# Patient Record
Sex: FEMALE | Race: WHITE | Employment: OTHER | URBAN - METROPOLITAN AREA
[De-identification: names, ages, dates, MRNs, and addresses within clinical notes are randomized per-mention and may not be internally consistent; named-entity substitution may affect disease eponyms.]

---

## 2017-08-17 ENCOUNTER — HOSPITAL ENCOUNTER (OUTPATIENT)
Dept: MAMMOGRAPHY | Age: 62
Discharge: HOME | End: 2017-08-17
Attending: OBSTETRICS & GYNECOLOGY
Payer: COMMERCIAL

## 2017-08-17 DIAGNOSIS — Z12.31 ENCOUNTER FOR SCREENING MAMMOGRAM FOR BREAST CANCER: ICD-10-CM

## 2017-08-17 PROCEDURE — 77067 SCR MAMMO BI INCL CAD: CPT

## 2018-09-27 ENCOUNTER — HOSPITAL ENCOUNTER (OUTPATIENT)
Dept: MAMMOGRAPHY | Age: 63
Discharge: HOME | End: 2018-09-27
Attending: OBSTETRICS & GYNECOLOGY
Payer: MEDICARE

## 2018-09-27 DIAGNOSIS — Z12.31 VISIT FOR SCREENING MAMMOGRAM: ICD-10-CM

## 2018-09-27 PROCEDURE — 77063 BREAST TOMOSYNTHESIS BI: CPT

## 2019-09-28 ENCOUNTER — HOSPITAL ENCOUNTER (OUTPATIENT)
Dept: MAMMOGRAPHY | Age: 64
Discharge: HOME | End: 2019-09-28
Attending: OBSTETRICS & GYNECOLOGY
Payer: MEDICARE

## 2019-09-28 DIAGNOSIS — Z12.39 SCREENING FOR BREAST CANCER: ICD-10-CM

## 2019-09-28 PROCEDURE — 77063 BREAST TOMOSYNTHESIS BI: CPT

## 2019-10-28 PROBLEM — N39.3 STRESS INCONTINENCE: Status: ACTIVE | Noted: 2019-10-28

## 2020-10-02 ENCOUNTER — HOSPITAL ENCOUNTER (OUTPATIENT)
Dept: MAMMOGRAPHY | Age: 65
Discharge: HOME | End: 2020-10-02
Attending: OBSTETRICS & GYNECOLOGY
Payer: MEDICARE

## 2020-10-02 DIAGNOSIS — R29.890 LOSS OF HEIGHT: ICD-10-CM

## 2020-10-02 DIAGNOSIS — Z12.39 SCREENING FOR BREAST CANCER: ICD-10-CM

## 2020-10-02 PROCEDURE — 77080 DXA BONE DENSITY AXIAL: CPT

## 2020-10-02 PROCEDURE — 77063 BREAST TOMOSYNTHESIS BI: CPT

## 2020-10-23 NOTE — PROGRESS NOTES
Harley Contreras your bone density looked pretty good.  I recommend vitamin D 5000 units and calcium 1200 mg daily

## 2021-10-23 ENCOUNTER — HOSPITAL ENCOUNTER (OUTPATIENT)
Dept: MAMMOGRAPHY | Age: 66
Discharge: HOME | End: 2021-10-23
Attending: OBSTETRICS & GYNECOLOGY
Payer: MEDICARE

## 2021-10-23 DIAGNOSIS — Z12.31 ENCOUNTER FOR SCREENING MAMMOGRAM FOR BREAST CANCER: ICD-10-CM

## 2021-10-23 PROCEDURE — 77063 BREAST TOMOSYNTHESIS BI: CPT

## 2022-03-19 PROBLEM — N39.3 STRESS INCONTINENCE: Status: ACTIVE | Noted: 2019-10-28

## 2022-07-01 RX ORDER — OMEPRAZOLE 40 MG/1
CAPSULE, DELAYED RELEASE ORAL
Qty: 90 CAPSULE | Refills: 1 | Status: SHIPPED | OUTPATIENT
Start: 2022-07-01

## 2022-10-03 ENCOUNTER — TELEPHONE (OUTPATIENT)
Dept: OBGYN CLINIC | Age: 67
End: 2022-10-03

## 2022-10-07 ENCOUNTER — OFFICE VISIT (OUTPATIENT)
Dept: OBGYN CLINIC | Age: 67
End: 2022-10-07
Payer: MEDICARE

## 2022-10-07 VITALS
BODY MASS INDEX: 30.13 KG/M2 | HEIGHT: 61 IN | SYSTOLIC BLOOD PRESSURE: 128 MMHG | DIASTOLIC BLOOD PRESSURE: 80 MMHG | WEIGHT: 159.6 LBS

## 2022-10-07 DIAGNOSIS — Z79.890 HORMONE REPLACEMENT THERAPY, POSTMENOPAUSAL: ICD-10-CM

## 2022-10-07 DIAGNOSIS — Z12.31 ENCOUNTER FOR SCREENING MAMMOGRAM FOR MALIGNANT NEOPLASM OF BREAST: ICD-10-CM

## 2022-10-07 DIAGNOSIS — N89.9 NODULE OF VAGINA: ICD-10-CM

## 2022-10-07 DIAGNOSIS — N95.1 SYMPTOMATIC MENOPAUSAL OR FEMALE CLIMACTERIC STATES: ICD-10-CM

## 2022-10-07 DIAGNOSIS — K92.1 HEMATOCHEZIA: ICD-10-CM

## 2022-10-07 DIAGNOSIS — Z12.72 VAGINAL PAP SMEAR: ICD-10-CM

## 2022-10-07 DIAGNOSIS — Z12.4 SCREENING FOR CERVICAL CANCER: Primary | ICD-10-CM

## 2022-10-07 PROCEDURE — G0101 CA SCREEN;PELVIC/BREAST EXAM: HCPCS | Performed by: OBSTETRICS & GYNECOLOGY

## 2022-10-07 PROCEDURE — G8399 PT W/DXA RESULTS DOCUMENT: HCPCS | Performed by: OBSTETRICS & GYNECOLOGY

## 2022-10-07 PROCEDURE — G8427 DOCREV CUR MEDS BY ELIG CLIN: HCPCS | Performed by: OBSTETRICS & GYNECOLOGY

## 2022-10-07 PROCEDURE — G8484 FLU IMMUNIZE NO ADMIN: HCPCS | Performed by: OBSTETRICS & GYNECOLOGY

## 2022-10-07 PROCEDURE — G8417 CALC BMI ABV UP PARAM F/U: HCPCS | Performed by: OBSTETRICS & GYNECOLOGY

## 2022-10-07 PROCEDURE — 3017F COLORECTAL CA SCREEN DOC REV: CPT | Performed by: OBSTETRICS & GYNECOLOGY

## 2022-10-07 PROCEDURE — 1036F TOBACCO NON-USER: CPT | Performed by: OBSTETRICS & GYNECOLOGY

## 2022-10-07 PROCEDURE — 1090F PRES/ABSN URINE INCON ASSESS: CPT | Performed by: OBSTETRICS & GYNECOLOGY

## 2022-10-07 PROCEDURE — 99214 OFFICE O/P EST MOD 30 MIN: CPT | Performed by: OBSTETRICS & GYNECOLOGY

## 2022-10-07 RX ORDER — PSEUDOEPHEDRINE HCL 30 MG
200 TABLET ORAL
COMMUNITY
Start: 2020-10-30

## 2022-10-07 RX ORDER — PROGESTERONE 100 MG/1
100 CAPSULE ORAL DAILY
Qty: 90 CAPSULE | Refills: 3 | Status: SHIPPED | OUTPATIENT
Start: 2022-10-07

## 2022-10-07 RX ORDER — ESTRADIOL 2 MG/1
2 TABLET ORAL DAILY
Qty: 90 TABLET | Refills: 3 | Status: SHIPPED | OUTPATIENT
Start: 2022-10-07

## 2022-10-07 NOTE — PROGRESS NOTES
Established pt of Dr. Cassy Hernandez   69 y.o.  1955  [id number]    Today:  10/7/2022     Reports:  G0, Lost weight drinking \"David\" flavored water. Her urine turned a \"reddish yellow\" over the past couple days. Stopped drinking \"David\", drinking plain water now, believes her urine is clearing. Denies:  dysuria, hematuria. A few months ago she felt faint, had acute onset of nausea and bloody diarrhea. Has a h/o hemorrhoids that bleed more when she increases her working out. BC:   post menopausal status.     Past Medical History:   Diagnosis Date    Hypercholesterolemia     Hypothyroid     Menopause        Past Surgical History:   Procedure Laterality Date    BREAST BIOPSY Left     benign    COLONOSCOPY  ;     due     KNEE ARTHROSCOPY Right 10/30/15    PRE-MALIGNANT / BENIGN SKIN LESION EXCISION         Family History   Problem Relation Age of Onset    Colon Cancer Neg Hx     Diabetes Mother     Hypertension Mother     Hypertension Father     Breast Cancer Neg Hx        OB History    Para Term  AB Living   0 0 0 0 0 0   SAB IAB Ectopic Molar Multiple Live Births   0 0 0 0 0 0       Social History     Socioeconomic History    Marital status:      Spouse name: None    Number of children: None    Years of education: None    Highest education level: None   Tobacco Use    Smoking status: Never    Smokeless tobacco: Never   Substance and Sexual Activity    Alcohol use: Yes    Drug use: No   Social History Narrative    Never had abnormal pap  No STDs     Jossie Ya          Current Outpatient Medications   Medication Sig    Probiotic Product (PROBIOTIC-10 PO) Take by mouth    Pediatric Multivitamins-Fl (MULTIVITAMINS/FL PO) TAKE ONE CAP/TAB BY MOUTH EVERY DAY    vitamin D (CHOLECALCIFEROL) 25 MCG (1000 UT) TABS tablet 25 mcg    docusate (COLACE, DULCOLAX) 100 MG CAPS 200 mg    omeprazole (PRILOSEC) 40 MG delayed release capsule TAKE 1 CAPSULE BY MOUTH EVERY DAY    Calcium Carb-Cholecalciferol 600-400 MG-UNIT CAPS Take by mouth    estradiol (ESTRACE) 2 MG tablet Take 2 mg by mouth daily    hydrocortisone 2.5 % cream Place rectally 4 times daily    levothyroxine (SYNTHROID) 75 MCG tablet TAKE 1 TABLET DAILY BEFORE BREAKFAST    progesterone (PROMETRIUM) 100 MG CAPS capsule TAKE 1 CAPSULE BY MOUTH EVERY DAY AT NIGHT    rosuvastatin (CRESTOR) 10 MG tablet Take 10 mg by mouth    Timolol (TIMOPTIC) 0.5 % SOLN ophthalmic solution Apply 1 drop to eye daily     No current facility-administered medications for this visit. Review of Systems    Updated from patient's \"Review of Systems\" form that patient completed. Physical Exam:   /80   Ht 5' 1\" (1.549 m)   Wt 159 lb 9.6 oz (72.4 kg)   BMI 30.16 kg/m² , No LMP recorded. Patient is postmenopausal.    Patient is a 69 y.o. female who appears pleasant, in no apparent distress, her given age, well developed, well nourished and with good attention to hygiene and body habitus. Oriented to person, place and time. Mood and affect normal and appropriate to situation. Head is normocephalic, atraumatic, without any gross head or neck masses. Neck: Neck exam reveals no abnormalities. Thyroid ~ 3-4 cm/symmetric, no abnormalities. Lymph nodes:   Neck lymph nodes are normal.   No supraclavicular lymphadenopathy noted. No axillary lymphadenopathy noted. No groin lymphadenopathy noted. Respiratory: Assessment of respiratory effort reveals even and non labored respirations. Lungs CTA. Cardiovascular: Heart auscultation reveals no murmurs, gallop, rubs or clicks. Skin: No skin rash, abnormal appearing nevi, subcutaneous nodules, lesions or ulcers observed. Abdomen: Abdomen soft, non tender without palpable masses. Palpation of liver reveals no abnormalities with respect to size, tenderness or masses. Palpation of spleen reveals no abnormalities with respect to size, tenderness or masses. Breast: Symmetrical, no: dimpling, retractions, adenopathy, dominant masses or nipple discharge elicited. Breasts show no palpable masses or tenderness. Bilateral Fibrocystic change is:  mild   No changes suspicious for malignancy      Genitourinary:  External genitalia are anatomically normal in appearance. Diffuse atrophic changes consistent with age and hormonal status noted throughout her SSE. Examination of urethral meatus reveals location normal and size normal.   Examination of urethra shows no abnormalities. Vagina:  a small nodule very high in the vagina adjacent to the cervix on the left vaginal sidewall ~ 3:00, ~ 5 mm max width, left vaginal sidewall pale with decreased rugations. Cervix: shows no pathology. Uterus:  Normal size, shape and contour. Suboptimal bimanual exam secondary to BMI   Adnexa and parametria show no masses, tenderness, organomegaly or nodularity. Examination of anus and perineum shows no abnormalities. Rectal exam reveals normal sphincter tone without presence of hemorrhoids or masses, stool heme negative     ASSESSMENT and PLAN    1. Importance of self breast exam reviewed, pt educated how to perform SBE.      2.  Her PCP, Dr. Demi Ramirez) follows her labs & manages her vaccines. 3.  Per pt:  covid vacc w/ boosters current. Flu vacc current 9/20/2022  Unsure when her last tdap vacc was, enouraged to update if she thinks it has been > 10 y  Needs to clarify w/ her PCP her pneumococcus status    Immunization History   Administered Date(s) Administered    Zoster Live (Zostavax) 05/24/2014     4. MMG is:  due after 10/25/2022, ordered    5. No h/o abnormal paps. Patient requests pap today. Dr. Everett has been doing her pap every other year. Left vaginal sidewall nodule adjacent to the cervix, cervical pap and left vaginal sidewall pap collected and sent separately    6.   Dermatologist, sees Dr. Paula Radford @ [facility]    7.  10/2020 Dexa:    IMPRESSION:     Using the World Health Organization criteria, the bone mineral density is   normal.       10 year probability of major osteoporotic fracture:  8.1%   10 year probability of hip fracture:  0.6%      Dr. Randall Reynoso advised:  vitamin D 5000 units and calcium 1200 mg daily    8. Colonoscopy current, pt can't recall who did her colonosocpy, thinks repeat is due ~ 2026. States Dr. Randall Reynoso referred her, unable to find a referral or any documentation of a colonosocpy in her current EMR, \"[facility] EMR or Care Everywhere\". Advised pt to let her PCP know if she experiences another episode of hematochezia. 9.  HRT  Estradiol 2 mg po/d and Prometrium 100 qhs. Suggest she decrease Estradiol to 1 mg/d, if hot flashes/night sweats worsen she can resume 2 mg/d. Message to Lance Milder: Please call patient, suggest she take half her estradiol tablet daily (take 1 mg instead of 2 mg). Ideally it is better to take the lowest effective dose of hormones. If she becomes symptomatic on the 1 mg dose (i.e. hot flashes/night sweats) then she can resume the 2 mg dose. 10.  Per Dr. Williams Chacon 10/2021 referral to ENT to eval chronic dry cough. 12/9/2021-->Laryngoscopy with Tariq Goodwin PA-C, dxd mild/moderate extraesophageal reflux    11. Per pt walks 4-5 miles 6 days/wk    Brought in her lab work from Dr. Hairston Ohm:   2/2022: Normal: CBC, CMP TSH (0.93) & free T4 (1.7). Slightly elevated: Total cholesterol 219 & . Triglycerides, HDL and VLDL are all normal.  HDL is 80     8/4/2022: Normal: CMP (except BUN/creatinine slightly low), CBC (except MCH slightly low), lipids all WNL, HDL 88  TSH 0.352 (slightly low, per PCP no change at this time, watching), free T4 1.7  Vitamin D 57    Re ROS--> non gynecologic concerns referred back to her PCP or appropriate specialist.       Wt Readings from Last 3 Encounters:   Wt Readings from Last 3 Encounters:   10/07/22 159 lb 9.6

## 2022-10-11 ENCOUNTER — TELEPHONE (OUTPATIENT)
Dept: OBGYN CLINIC | Age: 67
End: 2022-10-11

## 2022-10-11 NOTE — TELEPHONE ENCOUNTER
Called pt to clarify to take half of the estradiol tablet 1mg starting off and if her hot flashes and night sweat continues she can resume taking the 2mg dose.  Pt agreed and said that she will take the 1mg starting off.-Lidya DUMONT

## 2022-11-05 ENCOUNTER — HOSPITAL ENCOUNTER (OUTPATIENT)
Dept: MAMMOGRAPHY | Age: 67
Discharge: HOME | End: 2022-11-08
Payer: MEDICARE

## 2022-11-05 DIAGNOSIS — Z12.31 ENCOUNTER FOR SCREENING MAMMOGRAM FOR MALIGNANT NEOPLASM OF BREAST: ICD-10-CM

## 2022-11-05 PROCEDURE — 77067 SCR MAMMO BI INCL CAD: CPT

## 2022-11-06 DIAGNOSIS — N95.1 SYMPTOMATIC MENOPAUSAL OR FEMALE CLIMACTERIC STATES: ICD-10-CM

## 2022-11-07 RX ORDER — PROGESTERONE 100 MG/1
CAPSULE ORAL
Qty: 90 CAPSULE | Refills: 3 | OUTPATIENT
Start: 2022-11-07

## 2022-11-07 NOTE — RESULT ENCOUNTER NOTE
Notify pt cervical  pap nl, hpv neg, and negative cytology of vaginal sidewall lesion. Will reassess at f/u anticipate this is all normal/no concern. thanks.

## 2023-01-24 ENCOUNTER — OFFICE VISIT (OUTPATIENT)
Dept: OBGYN CLINIC | Age: 68
End: 2023-01-24
Payer: MEDICARE

## 2023-01-24 VITALS
BODY MASS INDEX: 30.4 KG/M2 | DIASTOLIC BLOOD PRESSURE: 74 MMHG | HEIGHT: 61 IN | SYSTOLIC BLOOD PRESSURE: 132 MMHG | WEIGHT: 161 LBS

## 2023-01-24 DIAGNOSIS — N89.8 VAGINAL DISCHARGE: Primary | ICD-10-CM

## 2023-01-24 DIAGNOSIS — Z79.890 CURRENT LONG-TERM USE OF POSTMENOPAUSAL HORMONE REPLACEMENT THERAPY: ICD-10-CM

## 2023-01-24 DIAGNOSIS — N89.8 VAGINAL ITCHING: ICD-10-CM

## 2023-01-24 PROCEDURE — G8399 PT W/DXA RESULTS DOCUMENT: HCPCS | Performed by: OBSTETRICS & GYNECOLOGY

## 2023-01-24 PROCEDURE — 1123F ACP DISCUSS/DSCN MKR DOCD: CPT | Performed by: OBSTETRICS & GYNECOLOGY

## 2023-01-24 PROCEDURE — G8417 CALC BMI ABV UP PARAM F/U: HCPCS | Performed by: OBSTETRICS & GYNECOLOGY

## 2023-01-24 PROCEDURE — 3017F COLORECTAL CA SCREEN DOC REV: CPT | Performed by: OBSTETRICS & GYNECOLOGY

## 2023-01-24 PROCEDURE — 99214 OFFICE O/P EST MOD 30 MIN: CPT | Performed by: OBSTETRICS & GYNECOLOGY

## 2023-01-24 PROCEDURE — 1036F TOBACCO NON-USER: CPT | Performed by: OBSTETRICS & GYNECOLOGY

## 2023-01-24 PROCEDURE — G8484 FLU IMMUNIZE NO ADMIN: HCPCS | Performed by: OBSTETRICS & GYNECOLOGY

## 2023-01-24 PROCEDURE — 1090F PRES/ABSN URINE INCON ASSESS: CPT | Performed by: OBSTETRICS & GYNECOLOGY

## 2023-01-24 PROCEDURE — G8427 DOCREV CUR MEDS BY ELIG CLIN: HCPCS | Performed by: OBSTETRICS & GYNECOLOGY

## 2023-01-24 RX ORDER — ESTRADIOL 0.5 MG/1
0.5 TABLET ORAL DAILY
Qty: 30 TABLET | Refills: 11 | Status: SHIPPED | OUTPATIENT
Start: 2023-01-24

## 2023-01-24 RX ORDER — LATANOPROST 50 UG/ML
SOLUTION/ DROPS OPHTHALMIC
COMMUNITY
Start: 2022-11-25

## 2023-01-24 NOTE — PROGRESS NOTES
Tommy Heads   69 y.o.  1955  [id number]    Today:  2023    F/u ov today to recheck vaginal nodule and discuss weaning off HRT. Is not sexually active, she and her  triny ok w abstinence     10/7/2022 ov:   Reports:  G0, Lost weight drinking \"[place]\" flavored water. Her urine turned a \"reddish yellow\" over the past couple days. Stopped drinking \"[place]\", drinking plain water now, believes her urine is clearing. Denies:  dysuria, hematuria. A few months ago she felt faint, had acute onset of nausea and bloody diarrhea. Has a h/o hemorrhoids that bleed more when she increases her working out. BC:   post menopausal status.     Past Medical History:   Diagnosis Date    Hypercholesterolemia     Hypothyroid     Menopause        Past Surgical History:   Procedure Laterality Date    BREAST BIOPSY Left     benign    COLONOSCOPY  ;     due     KNEE ARTHROSCOPY Right 10/30/15    PRE-MALIGNANT / BENIGN SKIN LESION EXCISION         Family History   Problem Relation Age of Onset    Diabetes Mother     Hypertension Mother     Hypertension Father     Colon Cancer Neg Hx     Breast Cancer Neg Hx     Ovarian Cancer Neg Hx     Prostate Cancer Neg Hx     Deep Vein Thrombosis Neg Hx     Pulmonary Embolism Neg Hx        OB History    Para Term  AB Living   0 0 0 0 0 0   SAB IAB Ectopic Molar Multiple Live Births   0 0 0 0 0 0   Obstetric Comments   G0       Social History     Socioeconomic History    Marital status:      Spouse name: None    Number of children: None    Years of education: None    Highest education level: None   Tobacco Use    Smoking status: Never    Smokeless tobacco: Never   Substance and Sexual Activity    Alcohol use: Yes    Drug use: No   Social History Narrative    Never had abnormal pap  No STDs     [address] [place] [address]        ENT:  Parish Phillips PA-C         Current Outpatient Medications   Medication Sig    latanoprost (XALATAN) 0.005 % ophthalmic solution INSTILL 1 DROP INTO BOTH EYES AT BEDTIME    estradiol (ESTRACE) 0.5 MG tablet Take 1 tablet by mouth daily Ok to discontinue estrogen and progesterone after 2-4 weeks of Estradiol 0.5 mg if you are tolerating the lower dose, if you are not having bad hot flashes or night sweats. terconazole (TERAZOL 7) 0.4 % vaginal cream 1 applicator intravaginal nightly x 7 nights    Probiotic Product (PROBIOTIC-10 PO) Take by mouth    Pediatric Multivitamins-Fl (MULTIVITAMINS/FL PO) TAKE ONE CAP/TAB BY MOUTH EVERY DAY    vitamin D (CHOLECALCIFEROL) 25 MCG (1000 UT) TABS tablet 25 mcg    docusate (COLACE, DULCOLAX) 100 MG CAPS 200 mg    progesterone (PROMETRIUM) 100 MG CAPS capsule Take 1 capsule by mouth daily TAKE 1 CAPSULE BY MOUTH DAILY    omeprazole (PRILOSEC) 40 MG delayed release capsule TAKE 1 CAPSULE BY MOUTH EVERY DAY    Calcium Carb-Cholecalciferol 600-400 MG-UNIT CAPS Take by mouth    hydrocortisone 2.5 % cream Place rectally 4 times daily    levothyroxine (SYNTHROID) 75 MCG tablet TAKE 1 TABLET DAILY BEFORE BREAKFAST    rosuvastatin (CRESTOR) 10 MG tablet Take 10 mg by mouth    Timolol (TIMOPTIC) 0.5 % SOLN ophthalmic solution Apply 1 drop to eye daily     No current facility-administered medications for this visit. Review of Systems    Updated from patient's \"Review of Systems\" form that patient completed. Physical Exam:   /74   Ht 5' 1\" (1.549 m)   Wt 161 lb (73 kg)   BMI 30.42 kg/m² , No LMP recorded. Patient is postmenopausal.    Patient is a 69 y.o. female who appears pleasant, in no apparent distress, her given age, well developed, well nourished and with good attention to hygiene and body habitus. Oriented to person, place and time. Mood and affect normal and appropriate to situation. Head is normocephalic, atraumatic, without any gross head or neck masses. Lymph nodes:    No groin lymphadenopathy noted.      Skin: No skin rash, abnormal appearing nevi, subcutaneous nodules, lesions or ulcers observed. Abdomen: Abdomen soft, non tender without palpable masses. Genitourinary:  External genitalia are anatomically normal in appearance. Bilateral symmetric inflammation. Diffuse atrophic changes consistent with age and hormonal status noted throughout her SSE. Examination of urethral meatus reveals location normal and size normal.   Examination of urethra shows no abnormalities. Vagina:  a small nodule very high in the vagina adjacent to the cervix on the left vaginal sidewall ~ 3:00, ~ 5 mm max width, left vaginal sidewall pale with decreased rugations. Appears pale/clear  Cervix: shows no pathology. ASSESSMENT and PLAN    1. F/u ~ 1 month w/ pelvic US  Left vaginal sidewall nodule adjacent to the cervix,     2. Vaginal itching, await NuSwab, ? HgbA1C -->Terazol cream (for empiric tx  when she is sx'tic w/ vaginal itching and clumpy white-pale yellow d/c)    3. Weaning off HRT:      10/7/2022 ov  7.  10/2020 Dexa:    IMPRESSION:     Using the World Health Organization criteria, the bone mineral density is   normal.       10 year probability of major osteoporotic fracture:  8.1%   10 year probability of hip fracture:  0.6%     8. Colonoscopy current, pt can't recall who did her colonoscopy, thinks repeat is due ~ 2026. States Dr. Socorro Ruiz referred her, unable to find a referral or any documentation of a colonoscopy in her current EMR, \"Connect [facility] EMR or Care Everywhere\". Advised pt to let her PCP know if she experiences another episode of hematochezia. 9.  HRT  Estradiol 2 mg po/d and Prometrium 100 qhs. Suggest she decrease Estradiol to 1 mg/d, if hot flashes/night sweats worsen she can resume 2 mg/d. Message to Julissa Almanzar: Please call patient, suggest she take half her estradiol tablet daily (take 1 mg instead of 2 mg). Ideally it is better to take the lowest effective dose of hormones.   If she becomes symptomatic on the 1 mg dose (i.e. hot flashes/night sweats) then she can resume the 2 mg dose. Re ROS--> non gynecologic concerns referred back to her PCP or appropriate specialist.       Wt Readings from Last 3 Encounters: Wt Readings from Last 3 Encounters:   01/24/23 161 lb (73 kg)   10/07/22 159 lb 9.6 oz (72.4 kg)   12/09/21 160 lb (72.6 kg)       BP Readings from Last 3 Encounters:  BP Readings from Last 3 Encounters:   01/24/23 132/74   10/07/22 128/80   09/28/21 (!) 140/78        Diagnosis Orders   1. Vaginal discharge  NuSwab Vaginitis Plus (VG+) with Canddia (Six Species)    NuSwab Vaginitis Plus (VG+) with Canddia (Six Species)    terconazole (TERAZOL 7) 0.4 % vaginal cream      2. Current long-term use of postmenopausal hormone replacement therapy  estradiol (ESTRACE) 0.5 MG tablet      3. Vaginal itching  terconazole (TERAZOL 7) 0.4 % vaginal cream        More than 50% of this 30-35+  minute visit was spent addressing the above patient concerns including:  assessment,  chart review and counseling the patient about the above concerns including evaluation plan, treatment strategies & documentation. Long conversation with patient, all her questions answered and addressed all her concerns. Dictated using voice recognition software.   Proofread but unrecognized errors may exist.    Signed by:  Gilman Essex, MD, [facility]

## 2023-01-28 LAB
A VAGINAE DNA VAG QL NAA+PROBE: NORMAL SCORE
BVAB2 DNA VAG QL NAA+PROBE: NORMAL SCORE
C ALBICANS DNA VAG QL NAA+PROBE: NEGATIVE
C GLABRATA DNA VAG QL NAA+PROBE: NEGATIVE
C TRACH RRNA SPEC QL NAA+PROBE: NEGATIVE
CANDIDA KRUSEI: NEGATIVE
CANDIDA LUSITANIAE, NAA, 180015: NEGATIVE
CANDIDA PARAPSILOSIS/TROPICALIS: NEGATIVE
MEGA1 DNA VAG QL NAA+PROBE: NORMAL SCORE
N GONORRHOEA RRNA SPEC QL NAA+PROBE: NEGATIVE
T VAGINALIS RRNA SPEC QL NAA+PROBE: NEGATIVE

## 2023-02-23 ENCOUNTER — PROCEDURE VISIT (OUTPATIENT)
Dept: OBGYN CLINIC | Age: 68
End: 2023-02-23
Payer: MEDICARE

## 2023-02-23 VITALS
DIASTOLIC BLOOD PRESSURE: 72 MMHG | HEIGHT: 61 IN | BODY MASS INDEX: 30.58 KG/M2 | WEIGHT: 162 LBS | SYSTOLIC BLOOD PRESSURE: 112 MMHG

## 2023-02-23 DIAGNOSIS — N89.9 NODULE OF VAGINA: Primary | ICD-10-CM

## 2023-02-23 PROCEDURE — 57100 BIOPSY VAGINAL MUCOSA SIMPLE: CPT | Performed by: OBSTETRICS & GYNECOLOGY

## 2023-02-23 SDOH — ECONOMIC STABILITY: FOOD INSECURITY: WITHIN THE PAST 12 MONTHS, YOU WORRIED THAT YOUR FOOD WOULD RUN OUT BEFORE YOU GOT MONEY TO BUY MORE.: NEVER TRUE

## 2023-02-23 SDOH — ECONOMIC STABILITY: FOOD INSECURITY: WITHIN THE PAST 12 MONTHS, THE FOOD YOU BOUGHT JUST DIDN'T LAST AND YOU DIDN'T HAVE MONEY TO GET MORE.: NEVER TRUE

## 2023-02-23 SDOH — ECONOMIC STABILITY: HOUSING INSECURITY
IN THE LAST 12 MONTHS, WAS THERE A TIME WHEN YOU DID NOT HAVE A STEADY PLACE TO SLEEP OR SLEPT IN A SHELTER (INCLUDING NOW)?: NO

## 2023-02-23 SDOH — ECONOMIC STABILITY: INCOME INSECURITY: HOW HARD IS IT FOR YOU TO PAY FOR THE VERY BASICS LIKE FOOD, HOUSING, MEDICAL CARE, AND HEATING?: NOT HARD AT ALL

## 2023-02-23 ASSESSMENT — PATIENT HEALTH QUESTIONNAIRE - PHQ9
SUM OF ALL RESPONSES TO PHQ9 QUESTIONS 1 & 2: 0
1. LITTLE INTEREST OR PLEASURE IN DOING THINGS: 0
SUM OF ALL RESPONSES TO PHQ QUESTIONS 1-9: 0
2. FEELING DOWN, DEPRESSED OR HOPELESS: 0
SUM OF ALL RESPONSES TO PHQ QUESTIONS 1-9: 0

## 2023-02-23 NOTE — PROGRESS NOTES
Pepe Kent  69 y.o.  1955  [id number]    Today:  2/23/23     OV today for vaginal sidewall bx. Done in colpo room to optimize visualization      Last pap:  10/2022 pap normal w/ neg HPV    No LMP recorded. Patient is postmenopausal.    BC:   post menopausal status. Allergies   Allergen Reactions    Bee Venom        Past Medical History:   Diagnosis Date    Hypercholesterolemia     Hypothyroid     Menopause        Past Surgical History:   Procedure Laterality Date    BREAST BIOPSY Left     benign    COLONOSCOPY  2006; 9/16    due 2026    KNEE ARTHROSCOPY Right 10/30/15    PRE-MALIGNANT / BENIGN SKIN LESION EXCISION         [unfilled]    Social History     Social History Narrative    Never had abnormal pap  No STDs     [address] [facility]        ENT:  Helena Leyden, PA-C       Family History   Problem Relation Age of Onset    Diabetes Mother     Hypertension Mother     Hypertension Father     Colon Cancer Neg Hx     Breast Cancer Neg Hx     Ovarian Cancer Neg Hx     Prostate Cancer Neg Hx     Deep Vein Thrombosis Neg Hx     Pulmonary Embolism Neg Hx        Physical Exam     /72   Ht 5' 1\" (1.549 m)   Wt 162 lb (73.5 kg)   BMI 30.61 kg/m²     Pepe Kent is a 69 y.o. female who appears pleasant, well developed, well nourished, with good attention to hygiene and body habitus. Oriented to person, place and time. Pepe Kent is in no apparent distress. Psych:  Mood and affect are normal and appropriate to the situation. Head is normocephalic, atraumatic, without any gross head or neck masses. Eyes: Sclera are clear. Conjunctiva and lids within normal limits. No icterus. Ears and Nose: no gross deformities to visual inspection, gross hearing intact    Skin:  No skin rash, subcutaneous nodules, lesions or ulcers observed.    External genitalia: normal anatomy  Diffuse atrophic changes consistent with age and hormonal status noted throughout her SSE   Vagina: pale w/ smooth sidewalls. Small nodule very high in the vagina, left vaginal sidewall ~3:00 relative to the cervix. Cervix: normal appearing, no lesions. Uterus: , normal size, shape and contour  Adnexa: bilaterally  no masses, non tender  Perineum and anus:  no abnormalities     Nodule excision from left vaginal sidewall: The left vaginal sidewall nodule was prepped w/ betadine then locally anesthetized with 1%  lidocaine. The nodule was removed sharply in it's entirety. Hemostasis was achieved using silver nitrate      A/P:  1.   left vaginal sidewall nodule , await biopsy results, will formulate treatment plan and follow up accordingly.     Dustin Ruth MD

## 2023-10-31 ENCOUNTER — TRANSCRIBE ORDERS (OUTPATIENT)
Dept: SCHEDULING | Age: 68
End: 2023-10-31

## 2023-10-31 DIAGNOSIS — Z12.31 SCREENING MAMMOGRAM FOR HIGH-RISK PATIENT: Primary | ICD-10-CM

## 2024-01-02 ENCOUNTER — OFFICE VISIT (OUTPATIENT)
Dept: OBGYN CLINIC | Age: 69
End: 2024-01-02
Payer: COMMERCIAL

## 2024-01-02 VITALS
SYSTOLIC BLOOD PRESSURE: 114 MMHG | DIASTOLIC BLOOD PRESSURE: 72 MMHG | HEIGHT: 61 IN | WEIGHT: 171.6 LBS | BODY MASS INDEX: 32.4 KG/M2

## 2024-01-02 DIAGNOSIS — Z12.31 ENCOUNTER FOR SCREENING MAMMOGRAM FOR MALIGNANT NEOPLASM OF BREAST: ICD-10-CM

## 2024-01-02 DIAGNOSIS — Z12.4 SCREENING FOR CERVICAL CANCER: ICD-10-CM

## 2024-01-02 DIAGNOSIS — Z11.51 SCREENING FOR HPV (HUMAN PAPILLOMAVIRUS): ICD-10-CM

## 2024-01-02 DIAGNOSIS — Z01.419 WELL WOMAN EXAM WITH ROUTINE GYNECOLOGICAL EXAM: Primary | ICD-10-CM

## 2024-01-02 DIAGNOSIS — Z13.820 SCREENING FOR OSTEOPOROSIS: ICD-10-CM

## 2024-01-02 DIAGNOSIS — E03.9 HYPOTHYROIDISM, UNSPECIFIED TYPE: ICD-10-CM

## 2024-01-02 PROCEDURE — G0101 CA SCREEN;PELVIC/BREAST EXAM: HCPCS | Performed by: OBSTETRICS & GYNECOLOGY

## 2024-01-02 NOTE — PROGRESS NOTES
Annual Exam  Established ptRenny Yuan   68 y.o.  1955  [id number]    Today:   24    * (prateek)    Presents for well woman annual exam.  Reports: Doing well, no gynecologic complaints, denies vaginal bleeding.  Stopped HRT, has 1-2 hot flashes/day that lasts ~a few minutes max.    Started blood pressure medicine ~ 2023 BPs are much improved.    2023 her PCP prescribed antibiotics and prednisone for a \"bad cold\" per patient she was in bed for a month.  Believes she picked up a virus when she took her  to the ER for a broken elbow.  Tested positive for COVID with home COVID testing.  Subsequently developed a yeast infection and was treated for that.  Previously exercised with a few friends, did a walking program and stretching.  Has not done that for a while following the respiratory illness.  Plans to resume.      Past Medical History:   Diagnosis Date    Hypercholesterolemia     Hypothyroid     Menopause        Past Surgical History:   Procedure Laterality Date    BREAST BIOPSY Left     benign    COLONOSCOPY  ;     KNEE ARTHROSCOPY Right 10/30/15    PRE-MALIGNANT / BENIGN SKIN LESION EXCISION      SQUAMOUS CELL CARCINOMA EXCISION  10/2023    hand       Family History   Problem Relation Age of Onset    Diabetes Mother     Hypertension Mother     Hypertension Father     Thyroid Disease Sister     Colon Cancer Neg Hx     Breast Cancer Neg Hx     Ovarian Cancer Neg Hx     Prostate Cancer Neg Hx     Deep Vein Thrombosis Neg Hx     Pulmonary Embolism Neg Hx        OB History    Para Term  AB Living   0 0 0 0 0 0   SAB IAB Ectopic Molar Multiple Live Births   0 0 0 0 0 0   Obstetric Comments   G0       Social History     Socioeconomic History    Marital status:      Spouse name: None    Number of children: None    Years of education: None    Highest education level: None   Tobacco Use    Smoking status: Never    Smokeless tobacco: Never

## 2024-01-05 LAB
COLLECTION METHOD: NORMAL
CYTOLOGIST CVX/VAG CYTO: NORMAL
CYTOLOGY CVX/VAG DOC THIN PREP: NORMAL
HPV APTIMA: NEGATIVE
HPV GENOTYPE REFLEX: NORMAL
Lab: NORMAL
OTHER PT INFO: NORMAL
PAP SOURCE: NORMAL
PATH REPORT.FINAL DX SPEC: NORMAL
PREV CYTO INFO: NEGATIVE
PREV TREATMENT RESULTS: NORMAL
PREV TREATMENT: NORMAL
STAT OF ADQ CVX/VAG CYTO-IMP: NORMAL

## 2024-01-08 RX ORDER — LOSARTAN POTASSIUM 50 MG/1
50 TABLET ORAL DAILY
COMMUNITY

## 2024-01-13 ENCOUNTER — HOSPITAL ENCOUNTER (OUTPATIENT)
Dept: MAMMOGRAPHY | Age: 69
End: 2024-01-13
Attending: OBSTETRICS & GYNECOLOGY
Payer: MEDICARE

## 2024-01-13 VITALS — BODY MASS INDEX: 32.1 KG/M2 | WEIGHT: 170 LBS | HEIGHT: 61 IN

## 2024-01-13 DIAGNOSIS — Z12.31 SCREENING MAMMOGRAM FOR HIGH-RISK PATIENT: ICD-10-CM

## 2024-01-13 PROCEDURE — 77063 BREAST TOMOSYNTHESIS BI: CPT

## 2025-01-06 SDOH — ECONOMIC STABILITY: FOOD INSECURITY: WITHIN THE PAST 12 MONTHS, YOU WORRIED THAT YOUR FOOD WOULD RUN OUT BEFORE YOU GOT MONEY TO BUY MORE.: NEVER TRUE

## 2025-01-06 SDOH — ECONOMIC STABILITY: FOOD INSECURITY: WITHIN THE PAST 12 MONTHS, THE FOOD YOU BOUGHT JUST DIDN'T LAST AND YOU DIDN'T HAVE MONEY TO GET MORE.: NEVER TRUE

## 2025-01-06 SDOH — ECONOMIC STABILITY: INCOME INSECURITY: HOW HARD IS IT FOR YOU TO PAY FOR THE VERY BASICS LIKE FOOD, HOUSING, MEDICAL CARE, AND HEATING?: NOT HARD AT ALL

## 2025-01-06 SDOH — ECONOMIC STABILITY: TRANSPORTATION INSECURITY
IN THE PAST 12 MONTHS, HAS LACK OF TRANSPORTATION KEPT YOU FROM MEETINGS, WORK, OR FROM GETTING THINGS NEEDED FOR DAILY LIVING?: NO

## 2025-01-09 ENCOUNTER — OFFICE VISIT (OUTPATIENT)
Dept: OBGYN CLINIC | Age: 70
End: 2025-01-09
Payer: MEDICARE

## 2025-01-09 VITALS
DIASTOLIC BLOOD PRESSURE: 76 MMHG | SYSTOLIC BLOOD PRESSURE: 130 MMHG | WEIGHT: 157 LBS | BODY MASS INDEX: 29.64 KG/M2 | HEIGHT: 61 IN

## 2025-01-09 DIAGNOSIS — E07.9 ASYMMETRICAL THYROID: ICD-10-CM

## 2025-01-09 DIAGNOSIS — Z12.31 ENCOUNTER FOR SCREENING MAMMOGRAM FOR MALIGNANT NEOPLASM OF BREAST: ICD-10-CM

## 2025-01-09 DIAGNOSIS — N95.8 GENITOURINARY SYNDROME OF MENOPAUSE: ICD-10-CM

## 2025-01-09 DIAGNOSIS — Z01.419 WELL WOMAN EXAM WITH ROUTINE GYNECOLOGICAL EXAM: Primary | ICD-10-CM

## 2025-01-09 DIAGNOSIS — Z11.51 SCREENING FOR HPV (HUMAN PAPILLOMAVIRUS): ICD-10-CM

## 2025-01-09 DIAGNOSIS — Z12.4 SCREENING FOR CERVICAL CANCER: ICD-10-CM

## 2025-01-09 PROCEDURE — G8399 PT W/DXA RESULTS DOCUMENT: HCPCS | Performed by: OBSTETRICS & GYNECOLOGY

## 2025-01-09 PROCEDURE — G8419 CALC BMI OUT NRM PARAM NOF/U: HCPCS | Performed by: OBSTETRICS & GYNECOLOGY

## 2025-01-09 PROCEDURE — 3017F COLORECTAL CA SCREEN DOC REV: CPT | Performed by: OBSTETRICS & GYNECOLOGY

## 2025-01-09 PROCEDURE — G0101 CA SCREEN;PELVIC/BREAST EXAM: HCPCS | Performed by: OBSTETRICS & GYNECOLOGY

## 2025-01-09 PROCEDURE — 99213 OFFICE O/P EST LOW 20 MIN: CPT | Performed by: OBSTETRICS & GYNECOLOGY

## 2025-01-09 PROCEDURE — G8427 DOCREV CUR MEDS BY ELIG CLIN: HCPCS | Performed by: OBSTETRICS & GYNECOLOGY

## 2025-01-09 PROCEDURE — 1036F TOBACCO NON-USER: CPT | Performed by: OBSTETRICS & GYNECOLOGY

## 2025-01-09 PROCEDURE — 1090F PRES/ABSN URINE INCON ASSESS: CPT | Performed by: OBSTETRICS & GYNECOLOGY

## 2025-01-09 RX ORDER — ESTRADIOL 0.1 MG/G
CREAM VAGINAL
Qty: 42.5 G | Refills: 3 | Status: SHIPPED | OUTPATIENT
Start: 2025-01-09

## 2025-01-09 ASSESSMENT — PATIENT HEALTH QUESTIONNAIRE - PHQ9
1. LITTLE INTEREST OR PLEASURE IN DOING THINGS: SEVERAL DAYS
SUM OF ALL RESPONSES TO PHQ9 QUESTIONS 1 & 2: 2
SUM OF ALL RESPONSES TO PHQ QUESTIONS 1-9: 2
2. FEELING DOWN, DEPRESSED OR HOPELESS: SEVERAL DAYS

## 2025-01-10 NOTE — PROGRESS NOTES
Annual Exam  Established ptRenny Yuan   69 y.o.  1955  [id number]    Today: 2025 ov:   * (russak)  Presents for well woman annual exam.  Reports: Doing well, no gynecologic complaints, denies vaginal bleeding.  Stopped HRT, has 1-2 hot flashes/day that lasts ~a few minutes max.    Started blood pressure medicine ~ 2023 BPs are much improved.    2023 her PCP prescribed antibiotics and prednisone for a \"bad cold\" per patient she was in bed for a month.  Believes she picked up a virus when she took her  to the ER for a broken elbow.  Tested positive for COVID with home COVID testing.  Subsequently developed a yeast infection and was treated for that.  Previously exercised with a few friends, did a walking program and stretching.  Has not done that for a while following the respiratory illness.  Plans to resume.      Past Medical History:   Diagnosis Date    Hypercholesterolemia     Hypothyroid     Menopause        Past Surgical History:   Procedure Laterality Date    BREAST BIOPSY Left     benign    COLONOSCOPY  ;     due     KNEE ARTHROSCOPY Right 10/30/15    PRE-MALIGNANT / BENIGN SKIN LESION EXCISION      SQUAMOUS CELL CARCINOMA EXCISION  10/2023    hand       Family History   Problem Relation Age of Onset    Diabetes Mother     Hypertension Mother     Hypertension Father     Thyroid Disease Sister     Colon Cancer Neg Hx     Breast Cancer Neg Hx     Ovarian Cancer Neg Hx     Prostate Cancer Neg Hx     Deep Vein Thrombosis Neg Hx     Pulmonary Embolism Neg Hx        OB History    Para Term  AB Living   0 0 0 0 0 0   SAB IAB Ectopic Molar Multiple Live Births   0 0 0 0 0 0   Obstetric Comments   G0       Social History     Socioeconomic History    Marital status:      Spouse name: None    Number of children: None    Years of education: None    Highest education level: None   Tobacco Use    Smoking status: Never    Smokeless tobacco:

## 2025-01-15 LAB
COLLECTION METHOD: NORMAL
CYTOLOGIST CVX/VAG CYTO: NORMAL
CYTOLOGY CVX/VAG DOC THIN PREP: NORMAL
DATE OF LMP: 0
HPV APTIMA: NEGATIVE
Lab: NORMAL
OTHER PT INFO: NORMAL
PAP SOURCE: NORMAL
PATH REPORT.FINAL DX SPEC: NORMAL
PREV CYTO INFO: NEGATIVE
PREV TREATMENT RESULTS: NORMAL
PREV TREATMENT: NORMAL
STAT OF ADQ CVX/VAG CYTO-IMP: NORMAL

## 2025-01-17 ENCOUNTER — HOSPITAL ENCOUNTER (OUTPATIENT)
Dept: ULTRASOUND IMAGING | Age: 70
Discharge: HOME | End: 2025-01-20
Payer: MEDICARE

## 2025-01-17 DIAGNOSIS — E07.9 ASYMMETRICAL THYROID: ICD-10-CM

## 2025-01-17 PROCEDURE — 76536 US EXAM OF HEAD AND NECK: CPT

## 2025-02-15 ENCOUNTER — HOSPITAL ENCOUNTER (OUTPATIENT)
Dept: MAMMOGRAPHY | Age: 70
Discharge: HOME | End: 2025-02-18
Attending: OBSTETRICS & GYNECOLOGY
Payer: MEDICARE

## 2025-02-15 VITALS — WEIGHT: 155 LBS | BODY MASS INDEX: 28.52 KG/M2 | HEIGHT: 62 IN

## 2025-02-15 DIAGNOSIS — Z12.31 ENCOUNTER FOR SCREENING MAMMOGRAM FOR MALIGNANT NEOPLASM OF BREAST: ICD-10-CM

## 2025-02-15 PROCEDURE — 77067 SCR MAMMO BI INCL CAD: CPT

## 2025-03-10 ENCOUNTER — RESULTS FOLLOW-UP (OUTPATIENT)
Dept: ULTRASOUND IMAGING | Age: 70
End: 2025-03-10